# Patient Record
Sex: MALE | Race: WHITE | NOT HISPANIC OR LATINO | Employment: FULL TIME | URBAN - METROPOLITAN AREA
[De-identification: names, ages, dates, MRNs, and addresses within clinical notes are randomized per-mention and may not be internally consistent; named-entity substitution may affect disease eponyms.]

---

## 2020-12-21 ENCOUNTER — HOSPITAL ENCOUNTER (EMERGENCY)
Facility: HOSPITAL | Age: 55
Discharge: HOME | End: 2020-12-21
Payer: COMMERCIAL

## 2020-12-21 VITALS
SYSTOLIC BLOOD PRESSURE: 141 MMHG | TEMPERATURE: 98.5 F | RESPIRATION RATE: 18 BRPM | HEIGHT: 67 IN | DIASTOLIC BLOOD PRESSURE: 82 MMHG | BODY MASS INDEX: 31.39 KG/M2 | HEART RATE: 82 BPM | WEIGHT: 200 LBS | OXYGEN SATURATION: 98 %

## 2020-12-21 DIAGNOSIS — Z20.822 CLOSE EXPOSURE TO COVID-19 VIRUS: Primary | ICD-10-CM

## 2020-12-21 PROCEDURE — 99282 EMERGENCY DEPT VISIT SF MDM: CPT

## 2020-12-21 PROCEDURE — 99283 EMERGENCY DEPT VISIT LOW MDM: CPT

## 2020-12-21 PROCEDURE — U0003 INFECTIOUS AGENT DETECTION BY NUCLEIC ACID (DNA OR RNA); SEVERE ACUTE RESPIRATORY SYNDROME CORONAVIRUS 2 (SARS-COV-2) (CORONAVIRUS DISEASE [COVID-19]), AMPLIFIED PROBE TECHNIQUE, MAKING USE OF HIGH THROUGHPUT TECHNOLOGIES AS DESCRIBED BY CMS-2020-01-R: HCPCS

## 2020-12-23 LAB — SARS-COV-2 RNA SPEC QL NAA+PROBE: DETECTED

## 2024-07-18 ENCOUNTER — TELEPHONE (OUTPATIENT)
Age: 59
End: 2024-07-18

## 2024-07-18 ENCOUNTER — PREP FOR PROCEDURE (OUTPATIENT)
Age: 59
End: 2024-07-18

## 2024-07-18 ENCOUNTER — OFFICE VISIT (OUTPATIENT)
Age: 59
End: 2024-07-18

## 2024-07-18 VITALS
DIASTOLIC BLOOD PRESSURE: 90 MMHG | TEMPERATURE: 98.2 F | OXYGEN SATURATION: 96 % | SYSTOLIC BLOOD PRESSURE: 152 MMHG | RESPIRATION RATE: 18 BRPM | HEART RATE: 95 BPM | WEIGHT: 226.8 LBS | BODY MASS INDEX: 35.6 KG/M2 | HEIGHT: 67 IN

## 2024-07-18 DIAGNOSIS — Z12.11 SCREENING FOR COLON CANCER: ICD-10-CM

## 2024-07-18 DIAGNOSIS — R63.5 WEIGHT GAIN: ICD-10-CM

## 2024-07-18 DIAGNOSIS — Z11.4 SCREENING FOR HIV (HUMAN IMMUNODEFICIENCY VIRUS): ICD-10-CM

## 2024-07-18 DIAGNOSIS — Z11.59 NEED FOR HEPATITIS C SCREENING TEST: ICD-10-CM

## 2024-07-18 DIAGNOSIS — Z01.00: ICD-10-CM

## 2024-07-18 DIAGNOSIS — R06.83 SNORING: ICD-10-CM

## 2024-07-18 DIAGNOSIS — K57.92 DIVERTICULITIS: ICD-10-CM

## 2024-07-18 DIAGNOSIS — F17.210 SMOKING GREATER THAN 20 PACK YEARS: ICD-10-CM

## 2024-07-18 DIAGNOSIS — I10 PRIMARY HYPERTENSION: Primary | ICD-10-CM

## 2024-07-18 DIAGNOSIS — F17.210 TOBACCO DEPENDENCE DUE TO CIGARETTES: ICD-10-CM

## 2024-07-18 DIAGNOSIS — Z12.11 SCREENING FOR COLON CANCER: Primary | ICD-10-CM

## 2024-07-18 NOTE — ASSESSMENT & PLAN NOTE
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
## 2024-07-18 NOTE — TELEPHONE ENCOUNTER
Scheduled date of colonoscopy (as of today): 8/7/24    Physician performing colonoscopy:  EMILIANO    Location of colonoscopy:  [facility]    Bowel prep reviewed with patient:  BASIA/KATARINA    Instructions reviewed with patient by: RAUL    Clearances:  NA  OA  Screening  Email

## 2024-07-18 NOTE — TELEPHONE ENCOUNTER
07/18/24  Screened by: Emy Carrizales    Referring Provider PCP    Pre- Screening:     There is no height or weight on file to calculate BMI.  Has patient been referred for a routine screening Colonoscopy? yes  Is the patient between 45-75 years old? yes      Previous Colonoscopy no   If yes:    Date:     Facility:     Reason:       Does the patient want to see a Gastroenterologist prior to their procedure OR are they having any GI symptoms? no    Has the patient been hospitalized or had abdominal surgery in the past 6 months? no    Does the patient use supplemental oxygen? no    Does the patient take Coumadin, Lovenox, Plavix, Elliquis, Xarelto, or other blood thinning medication? no    Has the patient had a stroke, cardiac event, or stent placed in the past year? no

## 2024-07-23 LAB
ALBUMIN SERPL-MCNC: 4.1 G/DL (ref 3.6–5.1)
ALBUMIN/GLOB SERPL: 1.8 (CALC) (ref 1–2.5)
ALP SERPL-CCNC: 82 U/L (ref 35–144)
ALT SERPL-CCNC: 28 U/L (ref 9–46)
AST SERPL-CCNC: 25 U/L (ref 10–35)
BASOPHILS # BLD AUTO: 63 CELLS/UL (ref 0–200)
BASOPHILS NFR BLD AUTO: 0.7 %
BILIRUB SERPL-MCNC: 0.5 MG/DL (ref 0.2–1.2)
BUN SERPL-MCNC: 15 MG/DL (ref 7–25)
BUN/CREAT SERPL: ABNORMAL (CALC) (ref 6–22)
CALCIUM SERPL-MCNC: 8.7 MG/DL (ref 8.6–10.3)
CHLORIDE SERPL-SCNC: 109 MMOL/L (ref 98–110)
CHOLEST SERPL-MCNC: 192 MG/DL
CHOLEST/HDLC SERPL: 4.4 (CALC)
CO2 SERPL-SCNC: 22 MMOL/L (ref 20–32)
CREAT SERPL-MCNC: 0.94 MG/DL (ref 0.7–1.3)
EOSINOPHIL # BLD AUTO: 171 CELLS/UL (ref 15–500)
EOSINOPHIL NFR BLD AUTO: 1.9 %
ERYTHROCYTE [DISTWIDTH] IN BLOOD BY AUTOMATED COUNT: 12.7 % (ref 11–15)
GFR/BSA.PRED SERPLBLD CYS-BASED-ARV: 95 ML/MIN/1.73M2
GLOBULIN SER CALC-MCNC: 2.3 G/DL (CALC) (ref 1.9–3.7)
GLUCOSE SERPL-MCNC: 181 MG/DL (ref 65–99)
HCT VFR BLD AUTO: 43 % (ref 38.5–50)
HDLC SERPL-MCNC: 44 MG/DL
HGB BLD-MCNC: 14.1 G/DL (ref 13.2–17.1)
LDLC SERPL CALC-MCNC: 122 MG/DL (CALC)
LYMPHOCYTES # BLD AUTO: 2970 CELLS/UL (ref 850–3900)
LYMPHOCYTES NFR BLD AUTO: 33 %
MCH RBC QN AUTO: 27.4 PG (ref 27–33)
MCHC RBC AUTO-ENTMCNC: 32.8 G/DL (ref 32–36)
MCV RBC AUTO: 83.5 FL (ref 80–100)
MONOCYTES # BLD AUTO: 747 CELLS/UL (ref 200–950)
MONOCYTES NFR BLD AUTO: 8.3 %
NEUTROPHILS # BLD AUTO: 5049 CELLS/UL (ref 1500–7800)
NEUTROPHILS NFR BLD AUTO: 56.1 %
NONHDLC SERPL-MCNC: 148 MG/DL (CALC)
PLATELET # BLD AUTO: 234 THOUSAND/UL (ref 140–400)
PMV BLD REES-ECKER: 11 FL (ref 7.5–12.5)
POTASSIUM SERPL-SCNC: 4.2 MMOL/L (ref 3.5–5.3)
PROT SERPL-MCNC: 6.4 G/DL (ref 6.1–8.1)
RBC # BLD AUTO: 5.15 MILLION/UL (ref 4.2–5.8)
SODIUM SERPL-SCNC: 142 MMOL/L (ref 135–146)
TRIGL SERPL-MCNC: 143 MG/DL
TSH SERPL-ACNC: 1.75 MIU/L (ref 0.4–4.5)
WBC # BLD AUTO: 9 THOUSAND/UL (ref 3.8–10.8)

## 2024-07-24 ENCOUNTER — HOSPITAL ENCOUNTER (OUTPATIENT)
Dept: CT IMAGING | Facility: HOSPITAL | Age: 59
Discharge: HOME | End: 2024-07-24
Payer: COMMERCIAL

## 2024-07-24 DIAGNOSIS — F17.210 TOBACCO DEPENDENCE DUE TO CIGARETTES: ICD-10-CM

## 2024-07-24 DIAGNOSIS — F17.210 SMOKING GREATER THAN 20 PACK YEARS: ICD-10-CM

## 2024-07-24 PROCEDURE — 71271 CT THORAX LUNG CANCER SCR C-: CPT

## 2024-07-25 ENCOUNTER — TELEPHONE (OUTPATIENT)
Age: 59
End: 2024-07-25

## 2024-07-26 ENCOUNTER — OFFICE VISIT (OUTPATIENT)
Age: 59
End: 2024-07-26
Payer: COMMERCIAL

## 2024-07-26 VITALS
SYSTOLIC BLOOD PRESSURE: 126 MMHG | HEIGHT: 67 IN | OXYGEN SATURATION: 98 % | DIASTOLIC BLOOD PRESSURE: 74 MMHG | WEIGHT: 228.8 LBS | HEART RATE: 90 BPM | TEMPERATURE: 97.8 F | BODY MASS INDEX: 35.91 KG/M2

## 2024-07-26 DIAGNOSIS — R73.03 PREDIABETES: ICD-10-CM

## 2024-07-26 DIAGNOSIS — Z71.6 ENCOUNTER FOR TOBACCO USE CESSATION COUNSELING: ICD-10-CM

## 2024-07-26 DIAGNOSIS — I10 PRIMARY HYPERTENSION: ICD-10-CM

## 2024-07-26 DIAGNOSIS — F17.210 TOBACCO DEPENDENCE DUE TO CIGARETTES: ICD-10-CM

## 2024-07-26 DIAGNOSIS — Z00.00 ANNUAL PHYSICAL EXAM: Primary | ICD-10-CM

## 2024-07-26 LAB — SL AMB POCT HEMOGLOBIN AIC: 6.1 (ref ?–6.5)

## 2024-07-26 PROCEDURE — 83036 HEMOGLOBIN GLYCOSYLATED A1C: CPT

## 2024-07-26 PROCEDURE — 99406 BEHAV CHNG SMOKING 3-10 MIN: CPT

## 2024-07-26 PROCEDURE — 99396 PREV VISIT EST AGE 40-64: CPT

## 2024-07-26 PROCEDURE — 99213 OFFICE O/P EST LOW 20 MIN: CPT

## 2024-07-26 RX ORDER — ATORVASTATIN CALCIUM 10 MG/1
10 TABLET, FILM COATED ORAL DAILY
Qty: 90 TABLET | Refills: 0 | Status: SHIPPED | OUTPATIENT
Start: 2024-07-26 | End: 2024-10-24

## 2024-07-26 RX ORDER — VARENICLINE TARTRATE 0.5 (11)-1
KIT ORAL
Qty: 53 EACH | Refills: 0 | Status: SHIPPED | OUTPATIENT
Start: 2024-07-26

## 2024-07-26 NOTE — ASSESSMENT & PLAN NOTE
BP Readings from Last 1 Encounters:   07/26/24 126/74     Blood pressure reading within normal limits at visit today  As per patient he states he feels more calm, likely some element of whitecoat hypertension present  We will continue to monitor, will not initiate antihypertensives at this visit due to normal blood pressure reading  Return to clinic in 1 month, if elevated at that time can consider initiation of low-dose ARB

## 2024-07-26 NOTE — ASSESSMENT & PLAN NOTE
Lab Results   Component Value Date    HGBA1C 6.1 07/26/2024     Point-of-care A1c demonstrated value of 6.1, consistent with prediabetes  Patient advised of dietary and lifestyle modification  Will withhold initiation of diabetes controlling medications at this point in time, will trial lifestyle modification  Return to clinic in 1 month for reassessment

## 2024-07-26 NOTE — PATIENT INSTRUCTIONS
"Patient Education     Routine physical for adults   The Basics   Written by the doctors and editors at [facility]   What is a physical? -- A physical is a routine visit, or \"check-up,\" with your doctor. You might also hear it called a \"wellness visit\" or \"preventive visit.\"  During each visit, the doctor will:   Ask about your physical and mental health   Ask about your habits, behaviors, and lifestyle   Do an exam   Give you vaccines if needed   Talk to you about any medicines you take   Give advice about your health   Answer your questions  Getting regular check-ups is an important part of taking care of your health. It can help your doctor find and treat any problems you have. But it's also important for preventing health problems.  A routine physical is different from a \"sick visit.\" A sick visit is when you see a doctor because of a health concern or problem. Since physicals are scheduled ahead of time, you can think about what you want to ask the doctor.  How often should I get a physical? -- It depends on your age and health. In general, for people age 21 years and older:   If you are younger than 50 years, you might be able to get a physical every 3 years.   If you are 50 years or older, your doctor might recommend a physical every year.  If you have an ongoing health condition, like diabetes or high blood pressure, your doctor will probably want to see you more often.  What happens during a physical? -- In general, each visit will include:   Physical exam - The doctor or nurse will check your height, weight, heart rate, and blood pressure. They will also look at your eyes and ears. They will ask about how you are feeling and whether you have any symptoms that bother you.   Medicines - It's a good idea to bring a list of all the medicines you take to each doctor visit. Your doctor will talk to you about your medicines and answer any questions. Tell them if you are having any side effects that bother you. You " "should also tell them if you are having trouble paying for any of your medicines.   Habits and behaviors - This includes:   Your diet   Your exercise habits   Whether you smoke, drink alcohol, or use drugs   Whether you are sexually active   Whether you feel safe at home  Your doctor will talk to you about things you can do to improve your health and lower your risk of health problems. They will also offer help and support. For example, if you want to quit smoking, they can give you advice and might prescribe medicines. If you want to improve your diet or get more physical activity, they can help you with this, too.   Lab tests, if needed - The tests you get will depend on your age and situation. For example, your doctor might want to check your:   Cholesterol   Blood sugar   Iron level   Vaccines - The recommended vaccines will depend on your age, health, and what vaccines you already had. Vaccines are very important because they can prevent certain serious or deadly infections.   Discussion of screening - \"Screening\" means checking for diseases or other health problems before they cause symptoms. Your doctor can recommend screening based on your age, risk, and preferences. This might include tests to check for:   Cancer, such as breast, prostate, cervical, ovarian, colorectal, prostate, lung, or skin cancer   Sexually transmitted infections, such as chlamydia and gonorrhea   Mental health conditions like depression and anxiety  Your doctor will talk to you about the different types of screening tests. They can help you decide which screenings to have. They can also explain what the results might mean.   Answering questions - The physical is a good time to ask the doctor or nurse questions about your health. If needed, they can refer you to other doctors or specialists, too.  Adults older than 65 years often need other care, too. As you get older, your doctor will talk to you about:   How to prevent falling at " home   Hearing or vision tests   Memory testing   How to take your medicines safely   Making sure that you have the help and support you need at home  All topics are updated as new evidence becomes available and our peer review process is complete.  This topic retrieved from [employer] on: May 02, 2024.  Topic 035456 Version 1.0  Release: 32.4.3 - C32.122  © 2024 UpToDate, Inc. and/or its affiliates. All rights reserved.  Consumer Information Use and Disclaimer   Disclaimer: This generalized information is a limited summary of diagnosis, treatment, and/or medication information. It is not meant to be comprehensive and should be used as a tool to help the user understand and/or assess potential diagnostic and treatment options. It does NOT include all information about conditions, treatments, medications, side effects, or risks that may apply to a specific patient. It is not intended to be medical advice or a substitute for the medical advice, diagnosis, or treatment of a health care provider based on the health care provider's examination and assessment of a patient's specific and unique circumstances. Patients must speak with a health care provider for complete information about their health, medical questions, and treatment options, including any risks or benefits regarding use of medications. This information does not endorse any treatments or medications as safe, effective, or approved for treating a specific patient. UpToDate, Inc. and its affiliates disclaim any warranty or liability relating to this information or the use thereof.The use of this information is governed by the Terms of Use, available at https://www.[employer].uwer.com/en/know/clinical-effectiveness-terms. 2024© UpToDate, Inc. and its affiliates and/or licensors. All rights reserved.  Copyright   © 2024 UpToDate, Inc. and/or its affiliates. All rights reserved.

## 2024-07-26 NOTE — PROGRESS NOTES
Adult Annual Physical  Name: Juan M Silver      : 1965      MRN: [id number]  Encounter Provider: Walter Chong MD  Encounter Date: 2024   Encounter department: [facility]    Assessment & Plan   Patient is a 59-year-old male with past medical history of hypertension, tobacco dependence, diverticulitis, who presents in for follow-up on labs and for annual physical.  Patient recently presented to [facility] and had blood work ordered at this time, with CMP demonstrating blood sugar of 180.  Patient does believe he was fasting for this blood work, but cannot recall with certainty.  A1c ordered at this time never performed by outside laboratory.  Repeat A1c ordered at visit today to confirm or disprove fasting status with findings of prediabetes.  Patient counseled on this finding, as well as on annual physical.  With completed labs and smoking history patient demonstrated elevated ASCVD risk of 12% and is thus a candidate for moderate intensity statin therapy.  Patient be initiated on Lipitor 10 mg daily with repeat CMP and lipid panel in 1 month to assess for medication tolerance with up titration from there.  Patient additionally started on Chantix with tobacco cessation counseling provided today.  Return in 1 month for med tolerance and escalation.    1. Annual physical exam  2. Prediabetes  Assessment & Plan:  Lab Results   Component Value Date    HGBA1C 6.1 2024     Point-of-care A1c demonstrated value of 6.1, consistent with prediabetes  Patient advised of dietary and lifestyle modification  Will withhold initiation of diabetes controlling medications at this point in time, will trial lifestyle modification  Return to clinic in 1 month for reassessment  Orders:  -     POCT hemoglobin A1c  -     atorvastatin (LIPITOR) 10 mg tablet; Take 1 tablet (10 mg total) by mouth daily  -     Comprehensive metabolic panel; Future  -     Lipid panel; Future  -     Comprehensive  metabolic panel  -     Lipid panel  3. Primary hypertension  Assessment & Plan:  BP Readings from Last 1 Encounters:   07/26/24 126/74     Blood pressure reading within normal limits at visit today  As per patient he states he feels more calm, likely some element of whitecoat hypertension present  We will continue to monitor, will not initiate antihypertensives at this visit due to normal blood pressure reading  Return to clinic in 1 month, if elevated at that time can consider initiation of low-dose ARB  Orders:  -     atorvastatin (LIPITOR) 10 mg tablet; Take 1 tablet (10 mg total) by mouth daily  -     Comprehensive metabolic panel; Future  -     Lipid panel; Future  -     Comprehensive metabolic panel  -     Lipid panel  4. Tobacco dependence due to cigarettes  Assessment & Plan:  Patient has 40-pack-year smoking history, currently smoking actively at this time  CT lung cancer screening ordered at prior visit, study has been performed awaiting interpretation  Patient endorses interest in tobacco cessation, various pharmacotherapy outlined with patient who opted for trial of Chantix    Plan:  Chantix prescribed to patient with appropriate up titration  Patient advised of side effects of medication  Referred to tobacco cessation support group  Return to clinic in 1 month for Chantix tolerance and escalation to full dose  Plan for 6-month course of medication  Orders:  -     atorvastatin (LIPITOR) 10 mg tablet; Take 1 tablet (10 mg total) by mouth daily  -     Comprehensive metabolic panel; Future  -     Lipid panel; Future  -     Varenicline Tartrate, Starter, (Chantix Starting Month Pak) 0.5 MG X 11 & 1 MG X 42 TBPK; 0.5 mg once daily for 3 days then 0.5mg twice daily for days 4-7 then 1 mg twice daily  -     Ambulatory referral to Smoking Cessation Program; Future  -     Comprehensive metabolic panel  -     Lipid panel  5. Encounter for tobacco use cessation counseling  -     Varenicline Tartrate, Starter,  (Chantix Starting Month Yovani) 0.5 MG X 11 & 1 MG X 42 TBPK; 0.5 mg once daily for 3 days then 0.5mg twice daily for days 4-7 then 1 mg twice daily  -     Ambulatory referral to Smoking Cessation Program; Future    Immunizations and preventive care screenings were discussed with patient today. Appropriate education was printed on patient's after visit summary.      Counseling:  Alcohol/drug use: discussed moderation in alcohol intake, the recommendations for healthy alcohol use, and avoidance of illicit drug use.  Dental Health: discussed importance of regular tooth brushing, flossing, and dental visits.  Injury prevention: discussed safety/seat belts, safety helmets, smoke detectors, carbon dioxide detectors, and smoking near bedding or upholstery.  Sexual health: discussed sexually transmitted diseases, partner selection, use of condoms, avoidance of unintended pregnancy, and contraceptive alternatives.  Exercise: the importance of regular exercise/physical activity was discussed. Recommend exercise 3-5 times per week for at least 30 minutes.       Depression Screening and Follow-up Plan: Patient was screened for depression during today's encounter. They screened negative with a PHQ-2 score of 0.        History of Present Illness     Adult Annual Physical:  Patient presents for annual physical.     Diet and Physical Activity:  - Diet/Nutrition: frequent junk food and poor diet.  - Exercise: no formal exercise.    Depression Screening:  - PHQ-2 Score: 0    General Health:  - Sleep: 4-6 hours of sleep on average and snores loudly.  - Hearing: normal hearing right ear and normal hearing left ear.  - Vision: wears glasses and goes for regular eye exams.  - Dental: no dental visits for > 1 year. has dentures     Health:  - History of STDs: no.   - Urinary symptoms: none.     Advanced Care Planning:  - Has an advanced directive?: no    - Has a durable medical POA?: no    - ACP document given to patient?: no      Review  "of Systems   Constitutional:  Negative for chills and fever.   HENT:  Negative for ear pain and sore throat.    Eyes:  Negative for pain and visual disturbance.   Respiratory:  Negative for cough and shortness of breath.    Cardiovascular:  Negative for chest pain and palpitations.   Gastrointestinal:  Negative for abdominal pain and vomiting.   Genitourinary:  Negative for dysuria and hematuria.   Musculoskeletal:  Negative for arthralgias and back pain.   Skin:  Negative for color change and rash.   Neurological:  Negative for seizures and syncope.   All other systems reviewed and are negative.    Medical History Reviewed by provider this encounter:  Tobacco  Allergies  Meds  Problems  Med Hx  Surg Hx  Fam Hx       Past Medical History   Past Medical History:   Diagnosis Date    Patient denies medical problems      Past Surgical History:   Procedure Laterality Date    NO PAST SURGERIES       History reviewed. No pertinent family history.  No current outpatient medications on file prior to visit.     No current facility-administered medications on file prior to visit.   No Known Allergies   No current outpatient medications on file prior to visit.     No current facility-administered medications on file prior to visit.      Social History     Tobacco Use    Smoking status: Every Day     Current packs/day: 1.00     Average packs/day: 1 pack/day for 40.0 years (40.0 ttl pk-yrs)     Types: Cigarettes     Start date: 7/18/1984    Smokeless tobacco: Never   Vaping Use    Vaping status: Former   Substance and Sexual Activity    Alcohol use: Not Currently    Drug use: Never    Sexual activity: Not on file       Objective     /74   Pulse 90   Temp 97.8 °F (36.6 °C)   Ht 5' 7\" (1.702 m)   Wt 104 kg (228 lb 12.8 oz)   SpO2 98%   BMI 35.84 kg/m²     Physical Exam  Vitals and nursing note reviewed.   Constitutional:       General: He is not in acute distress.     Appearance: He is well-developed. He is " obese. He is not ill-appearing.      Comments: BMI 35   HENT:      Head: Normocephalic and atraumatic.      Right Ear: External ear normal.      Left Ear: External ear normal.      Nose: Nose normal.      Mouth/Throat:      Mouth: Mucous membranes are moist.      Pharynx: Oropharynx is clear.   Eyes:      General: No scleral icterus.     Extraocular Movements: Extraocular movements intact.      Conjunctiva/sclera: Conjunctivae normal.   Cardiovascular:      Rate and Rhythm: Normal rate and regular rhythm.      Pulses: Normal pulses.      Heart sounds: Normal heart sounds. No murmur heard.     No friction rub. No gallop.   Pulmonary:      Effort: Pulmonary effort is normal. No respiratory distress.      Breath sounds: Normal breath sounds. No wheezing, rhonchi or rales.   Abdominal:      Palpations: Abdomen is soft. There is no mass.      Tenderness: There is no abdominal tenderness. There is no guarding.   Musculoskeletal:         General: No swelling.      Cervical back: Neck supple.      Right lower leg: No edema.      Left lower leg: No edema.   Lymphadenopathy:      Cervical: No cervical adenopathy.   Skin:     General: Skin is warm and dry.      Capillary Refill: Capillary refill takes less than 2 seconds.   Neurological:      Mental Status: He is alert. Mental status is at baseline.   Psychiatric:         Mood and Affect: Mood normal.

## 2024-07-26 NOTE — LETTER
To whom it may concern,    Juan M Silver is under my medical care. He has been seen and evaluated in my office on several occasions. His blood pressure has remained within normal limits on these visits, and he will continue to have routine follow up with my office for ongoing management. At this point I believe his is fit to return to work as a .     If you have any questions, please don't hesitate to reach out to our office  Sincerely,    Dr. Walter Chong MD

## 2024-07-29 ENCOUNTER — PATIENT OUTREACH (OUTPATIENT)
Dept: OTHER | Facility: CLINIC | Age: 59
End: 2024-07-29

## 2024-07-31 ENCOUNTER — TELEPHONE (OUTPATIENT)
Age: 59
End: 2024-07-31

## 2024-07-31 NOTE — TELEPHONE ENCOUNTER
Rescheduled    Scheduled date of colonoscopy (as of today): 8-  Physician performing colonoscopy: Dr. Mcnamara  Location of colonoscopy: [facility]  Bowel prep reviewed with patient: miralax dulcolax reviewed 7-  Instructions reviewed with patient by: MARK  Clearances: N/A

## 2024-08-04 DIAGNOSIS — N28.1 CYST OF LEFT KIDNEY: Primary | ICD-10-CM

## 2024-08-05 ENCOUNTER — TELEPHONE (OUTPATIENT)
Dept: FAMILY MEDICINE CLINIC | Facility: CLINIC | Age: 59
End: 2024-08-05

## 2024-08-05 NOTE — TELEPHONE ENCOUNTER
Lvm to go over results    Walter Chong MD  [facility]  Lung cancer screening is within normal limits, however the scan did cover a portion of the left kidney with findings of a small fluid-filled pocket called a cyst. I have ordered a renal ultrasound of this area to better evaluate this finding. Please call patient and advise. Thank you!

## 2024-08-05 NOTE — PROGRESS NOTES
Recent CT lungs cancer screening for this patient demonstrated no acute pulmonary pathology or suspicious lesions, but did demonstrate left kidney cystic structure.  Per radiologist read, recommend renal ultrasound for better evaluation of this finding.  Ultrasound ordered, patient advised via [employer] in office advised to call in a.m. for follow-up.

## 2024-08-06 ENCOUNTER — RA CDI HCC (OUTPATIENT)
Dept: OTHER | Facility: HOSPITAL | Age: 59
End: 2024-08-06

## 2024-08-06 DIAGNOSIS — E66.01 SEVERE OBESITY (BMI 35.0-39.9) WITH COMORBIDITY (HCC): Primary | ICD-10-CM

## 2024-08-06 NOTE — PROGRESS NOTES
HCC coding opportunities          Chart Reviewed number of suggestions sent to Provider: 1   E66.01, Z68.35    Patients Insurance        Commercial Insurance: [employer]

## 2024-08-09 ENCOUNTER — HOSPITAL ENCOUNTER (OUTPATIENT)
Dept: ULTRASOUND IMAGING | Facility: HOSPITAL | Age: 59
End: 2024-08-09
Payer: COMMERCIAL

## 2024-08-09 DIAGNOSIS — N28.1 CYST OF LEFT KIDNEY: ICD-10-CM

## 2024-08-09 PROCEDURE — 76770 US EXAM ABDO BACK WALL COMP: CPT

## 2024-08-12 ENCOUNTER — OFFICE VISIT (OUTPATIENT)
Age: 59
End: 2024-08-12
Payer: COMMERCIAL

## 2024-08-12 VITALS
HEIGHT: 67 IN | HEART RATE: 77 BPM | WEIGHT: 231.6 LBS | BODY MASS INDEX: 36.35 KG/M2 | DIASTOLIC BLOOD PRESSURE: 82 MMHG | RESPIRATION RATE: 16 BRPM | SYSTOLIC BLOOD PRESSURE: 142 MMHG | OXYGEN SATURATION: 97 %

## 2024-08-12 DIAGNOSIS — I10 PRIMARY HYPERTENSION: ICD-10-CM

## 2024-08-12 DIAGNOSIS — N28.1 RENAL CYST, LEFT: ICD-10-CM

## 2024-08-12 DIAGNOSIS — F17.210 TOBACCO DEPENDENCE DUE TO CIGARETTES: Primary | ICD-10-CM

## 2024-08-12 PROCEDURE — 99213 OFFICE O/P EST LOW 20 MIN: CPT

## 2024-08-12 RX ORDER — VARENICLINE TARTRATE 1 MG/1
1 TABLET, FILM COATED ORAL 2 TIMES DAILY
Qty: 60 TABLET | Refills: 2 | Status: SHIPPED | OUTPATIENT
Start: 2024-08-12 | End: 2024-11-10

## 2024-08-12 NOTE — ASSESSMENT & PLAN NOTE
BP Readings from Last 1 Encounters:   08/12/24 142/82   Blood pressure initially elevated at office visit today, improved on recheck  Likely symptom of whitecoat hypertension, no indication for initiation of antihypertensives at this visit  Continue to monitor, low threshold to initiate antihypertensives in this patient

## 2024-08-12 NOTE — PROGRESS NOTES
Ambulatory Visit  Name: Juan M Silver      : 1965      MRN: [id number]  Encounter Provider: Walter Chong MD  Encounter Date: 2024   Encounter department: [facility]    Assessment & Plan   Patient is a 59-year-old male past medical history of obesity, diverticulitis, prediabetes, presents today for review of imaging.  Recently ordered renal ultrasound demonstrates simple renal cyst, no further concerns at this point in time.  Patient tolerating Chantix therapy well at this point in time, has nearly completed starter pack and will be escalated to full dose for 5 months to complete 6 months overall.  Return to clinic in 6 months for recheck of liver function and Chantix discontinuation, or sooner if needed for problem visit    1. Tobacco dependence due to cigarettes  Assessment & Plan:  Not currently smoking on 1mg Chantix BID  Increased appetite since cessation of smoking  PLAN:   -continue 1mg Chantix BID   -counseled on exercise and watching diet as cessation of tobacco may increase appetite     Orders:  -     varenicline (CHANTIX) 1 mg tablet; Take 1 tablet (1 mg total) by mouth 2 (two) times a day  2. Renal cyst, left  Assessment & Plan:  Kidney and bladder US on 24 showed:   1. Benign-appearing simple cyst in the left lower pole measuring 1.7 cm.   2. Nonobstructing calculus in the left lower pole measuring 0.6 cm.  PLAN:   -no further evaluation necessary   -will investigate further if hematuria or flank pain occur   3. Primary hypertension  Assessment & Plan:  BP Readings from Last 1 Encounters:   24 142/82   Blood pressure initially elevated at office visit today, improved on recheck  Likely symptom of whitecoat hypertension, no indication for initiation of antihypertensives at this visit  Continue to monitor, low threshold to initiate antihypertensives in this patient       History of Present Illness     Mr. Silver is a 59M with a PMHx of hypertension, tobacco  dependence, diverticulitis, HLD, prediabetes who presents for results follow up of a kidney/bladder US after an incidental finding of a L kidney cystic lesion on lung cancer screening CT. Mr. Silver denies any flank pain or hematuria, denies personal history of kidney stones.   He is slightly concerned about his weight gain over the past few weeks, but notes he has been eating more since he quit smoking ~1 month ago.           Review of Systems   Constitutional:  Positive for appetite change. Negative for activity change.   Gastrointestinal:  Negative for abdominal distention, abdominal pain, constipation, diarrhea and nausea.   Genitourinary:  Negative for difficulty urinating, dysuria, flank pain and hematuria.     Medical History Reviewed by provider this encounter:  Tobacco  Allergies  Meds  Problems  Med Hx  Surg Hx  Fam Hx       Past Medical History   Past Medical History:   Diagnosis Date    Patient denies medical problems      Past Surgical History:   Procedure Laterality Date    NO PAST SURGERIES       History reviewed. No pertinent family history.  Current Outpatient Medications on File Prior to Visit   Medication Sig Dispense Refill    atorvastatin (LIPITOR) 10 mg tablet Take 1 tablet (10 mg total) by mouth daily 90 tablet 0    Varenicline Tartrate, Starter, (Chantix Starting Month Yovani) 0.5 MG X 11 & 1 MG X 42 TBPK 0.5 mg once daily for 3 days then 0.5mg twice daily for days 4-7 then 1 mg twice daily 53 each 0     No current facility-administered medications on file prior to visit.   No Known Allergies   Current Outpatient Medications on File Prior to Visit   Medication Sig Dispense Refill    atorvastatin (LIPITOR) 10 mg tablet Take 1 tablet (10 mg total) by mouth daily 90 tablet 0    Varenicline Tartrate, Starter, (Chantix Starting Month Yovani) 0.5 MG X 11 & 1 MG X 42 TBPK 0.5 mg once daily for 3 days then 0.5mg twice daily for days 4-7 then 1 mg twice daily 53 each 0     No current  "facility-administered medications on file prior to visit.      Social History     Tobacco Use    Smoking status: Every Day     Current packs/day: 1.00     Average packs/day: 1 pack/day for 40.1 years (40.1 ttl pk-yrs)     Types: Cigarettes     Start date: 7/18/1984    Smokeless tobacco: Never   Vaping Use    Vaping status: Former   Substance and Sexual Activity    Alcohol use: Not Currently    Drug use: Never    Sexual activity: Not on file     Objective     /82   Pulse 77   Resp 16   Ht 5' 7\" (1.702 m)   Wt 105 kg (231 lb 9.6 oz)   SpO2 97%   BMI 36.27 kg/m²     Physical Exam  Constitutional:       General: He is not in acute distress.     Appearance: He is not ill-appearing or toxic-appearing.   HENT:      Head: Normocephalic.      Mouth/Throat:      Mouth: Mucous membranes are moist.   Eyes:      Extraocular Movements: Extraocular movements intact.      Conjunctiva/sclera: Conjunctivae normal.      Pupils: Pupils are equal, round, and reactive to light.   Cardiovascular:      Rate and Rhythm: Normal rate and regular rhythm.      Heart sounds: Normal heart sounds. No murmur heard.     No friction rub. No gallop.   Pulmonary:      Effort: Pulmonary effort is normal.      Breath sounds: Normal breath sounds.   Abdominal:      Palpations: Abdomen is soft.      Tenderness: There is no abdominal tenderness.   Musculoskeletal:      Right lower leg: No edema.      Left lower leg: No edema.   Skin:     General: Skin is warm and dry.   Neurological:      Mental Status: He is alert.   Psychiatric:         Mood and Affect: Mood normal.         Behavior: Behavior normal.       Administrative Statements           "

## 2024-08-12 NOTE — ASSESSMENT & PLAN NOTE
Not currently smoking on 1mg Chantix BID  Increased appetite since cessation of smoking  PLAN:   -continue 1mg Chantix BID   -counseled on exercise and watching diet as cessation of tobacco may increase appetite

## 2024-08-12 NOTE — ASSESSMENT & PLAN NOTE
Kidney and bladder US on 8/9/24 showed:   1. Benign-appearing simple cyst in the left lower pole measuring 1.7 cm.   2. Nonobstructing calculus in the left lower pole measuring 0.6 cm.  PLAN:   -no further evaluation necessary   -will investigate further if hematuria or flank pain occur

## 2024-08-20 ENCOUNTER — ANESTHESIA (OUTPATIENT)
Dept: GASTROENTEROLOGY | Facility: HOSPITAL | Age: 59
End: 2024-08-20

## 2024-08-20 ENCOUNTER — HOSPITAL ENCOUNTER (OUTPATIENT)
Dept: GASTROENTEROLOGY | Facility: HOSPITAL | Age: 59
Setting detail: OUTPATIENT SURGERY
Discharge: HOME | End: 2024-08-20
Attending: COLON & RECTAL SURGERY
Payer: COMMERCIAL

## 2024-08-20 ENCOUNTER — ANESTHESIA EVENT (OUTPATIENT)
Dept: GASTROENTEROLOGY | Facility: HOSPITAL | Age: 59
End: 2024-08-20

## 2024-08-20 VITALS
RESPIRATION RATE: 17 BRPM | DIASTOLIC BLOOD PRESSURE: 92 MMHG | OXYGEN SATURATION: 95 % | BODY MASS INDEX: 36 KG/M2 | HEART RATE: 83 BPM | HEIGHT: 67 IN | WEIGHT: 229.4 LBS | SYSTOLIC BLOOD PRESSURE: 150 MMHG | TEMPERATURE: 97.6 F

## 2024-08-20 DIAGNOSIS — Z12.11 SCREENING FOR COLON CANCER: ICD-10-CM

## 2024-08-20 PROCEDURE — 88305 TISSUE EXAM BY PATHOLOGIST: CPT | Performed by: PATHOLOGY

## 2024-08-20 PROCEDURE — 45385 COLONOSCOPY W/LESION REMOVAL: CPT | Performed by: SURGERY

## 2024-08-20 RX ORDER — SODIUM CHLORIDE, SODIUM LACTATE, POTASSIUM CHLORIDE, CALCIUM CHLORIDE 600; 310; 30; 20 MG/100ML; MG/100ML; MG/100ML; MG/100ML
INJECTION, SOLUTION INTRAVENOUS CONTINUOUS PRN
Status: DISCONTINUED | OUTPATIENT
Start: 2024-08-20 | End: 2024-08-20

## 2024-08-20 RX ORDER — PROPOFOL 10 MG/ML
INJECTION, EMULSION INTRAVENOUS AS NEEDED
Status: DISCONTINUED | OUTPATIENT
Start: 2024-08-20 | End: 2024-08-20

## 2024-08-20 RX ADMIN — PROPOFOL 150 MG: 10 INJECTION, EMULSION INTRAVENOUS at 10:01

## 2024-08-20 RX ADMIN — PROPOFOL 50 MG: 10 INJECTION, EMULSION INTRAVENOUS at 10:04

## 2024-08-20 RX ADMIN — PROPOFOL 50 MG: 10 INJECTION, EMULSION INTRAVENOUS at 10:10

## 2024-08-20 RX ADMIN — SODIUM CHLORIDE, SODIUM LACTATE, POTASSIUM CHLORIDE, AND CALCIUM CHLORIDE: .6; .31; .03; .02 INJECTION, SOLUTION INTRAVENOUS at 09:44

## 2024-08-20 RX ADMIN — PROPOFOL 50 MG: 10 INJECTION, EMULSION INTRAVENOUS at 10:07

## 2024-08-20 RX ADMIN — PROPOFOL 50 MG: 10 INJECTION, EMULSION INTRAVENOUS at 10:15

## 2024-08-20 NOTE — ANESTHESIA PREPROCEDURE EVALUATION
Procedure:  COLONOSCOPY    Relevant Problems   ANESTHESIA (within normal limits)      CARDIO   (+) Primary hypertension      /RENAL   (+) Renal cyst, left      Other   (+) Prediabetes        Physical Exam    Airway    Mallampati score: III  TM Distance: >3 FB  Neck ROM: full     Dental   Comment: Full upper plate; partial lower plate     Cardiovascular      Pulmonary      Other Findings        Anesthesia Plan  ASA Score- 2     Anesthesia Type- IV sedation with anesthesia with ASA Monitors.         Additional Monitors:     Airway Plan:            Plan Factors-Exercise tolerance (METS): >4 METS.    Chart reviewed.   Existing labs reviewed. Patient summary reviewed.    Patient is not a current smoker.              Induction- intravenous.    Postoperative Plan-         Informed Consent- Anesthetic plan and risks discussed with patient.  I personally reviewed this patient with the CRNA. Discussed and agreed on the Anesthesia Plan with the CRNA..

## 2024-08-20 NOTE — H&P
History and Physical   Colon and Rectal Surgery   Juan M Silver 59 y.o. male MRN: [id number]  Unit/Bed#:  Encounter: [id number]  08/20/24   @[facility]    No chief complaint on file.        History of Present Illness   HPI:  Juan M Silver is a 59 y.o. male who presents for colonoscopy.      Historical Information   Past Medical History:   Diagnosis Date    Patient denies medical problems      Past Surgical History:   Procedure Laterality Date    NO PAST SURGERIES         Meds/Allergies     Not in a hospital admission.      Current Outpatient Medications:     atorvastatin (LIPITOR) 10 mg tablet, Take 1 tablet (10 mg total) by mouth daily, Disp: 90 tablet, Rfl: 0    varenicline (CHANTIX) 1 mg tablet, Take 1 tablet (1 mg total) by mouth 2 (two) times a day, Disp: 60 tablet, Rfl: 2    Varenicline Tartrate, Starter, (Chantix Starting Month Pak) 0.5 MG X 11 & 1 MG X 42 TBPK, 0.5 mg once daily for 3 days then 0.5mg twice daily for days 4-7 then 1 mg twice daily, Disp: 53 each, Rfl: 0    No Known Allergies      Social History   Social History     Substance and Sexual Activity   Alcohol Use Not Currently     Social History     Substance and Sexual Activity   Drug Use Never     Social History     Tobacco Use   Smoking Status Every Day    Current packs/day: 1.00    Average packs/day: 1 pack/day for 40.1 years (40.1 ttl pk-yrs)    Types: Cigarettes    Start date: 7/18/1984   Smokeless Tobacco Never         Family History: No family history on file.      Objective     Current Vitals:      No intake or output data in the 24 hours ending 08/20/24 0841    Physical Exam:  General:  Resting comfortably in bed   Eyes:Sclera anicteric  ENT: Trachea midline  Pulm:  Symmetric chest raise.  No respiratory Distress  CV:  Regular on monitor  Abdomen:  Soft NT ND  Extremities:  No clubbing/ cyanosis/ edema    Lab Results: I have personally reviewed pertinent lab results.    Imaging: I have personally reviewed pertinent reports.        ASSESSMENT:   Juan M Silver is a 59 y.o. male who presents for outpatient colonoscopy.      PLAN:  For colonoscopy    Risks/ Benefits reviewed to include but not limited to anesthesia, bleeding, missed lesions, and colonoscopic perforation requiring surgery.

## 2024-08-20 NOTE — ANESTHESIA POSTPROCEDURE EVALUATION
Post-Op Assessment Note    CV Status:  Stable  Pain Score: 0    Pain management: adequate       Mental Status:  Arousable and sleepy   Hydration Status:  Stable   PONV Controlled:  Controlled   Airway Patency:  Patent     Post Op Vitals Reviewed: Yes    No anethesia notable event occurred.    Staff: CRNA               BP   145/93   Temp 97.6   Pulse 83   Resp 14   SpO2 99

## 2024-08-23 PROCEDURE — 88305 TISSUE EXAM BY PATHOLOGIST: CPT | Performed by: PATHOLOGY

## 2024-08-26 ENCOUNTER — TELEPHONE (OUTPATIENT)
Age: 59
End: 2024-08-26

## 2024-08-26 NOTE — TELEPHONE ENCOUNTER
Recall changed to 5 years; pt aware.   
Wife called in to go over results of colonoscopy.   All questions answered.   When should his next colonoscopy be?  Can we place a recall for him?  
detailed exam

## 2024-08-29 ENCOUNTER — OFFICE VISIT (OUTPATIENT)
Age: 59
End: 2024-08-29
Payer: COMMERCIAL

## 2024-08-29 VITALS
SYSTOLIC BLOOD PRESSURE: 132 MMHG | OXYGEN SATURATION: 98 % | WEIGHT: 229.8 LBS | BODY MASS INDEX: 35.99 KG/M2 | DIASTOLIC BLOOD PRESSURE: 72 MMHG | HEART RATE: 83 BPM | TEMPERATURE: 98.2 F

## 2024-08-29 DIAGNOSIS — E66.01 SEVERE OBESITY (BMI 35.0-39.9) WITH COMORBIDITY (HCC): Primary | ICD-10-CM

## 2024-08-29 DIAGNOSIS — E78.00 ELEVATED LDL CHOLESTEROL LEVEL: ICD-10-CM

## 2024-08-29 PROCEDURE — 99213 OFFICE O/P EST LOW 20 MIN: CPT

## 2024-08-29 NOTE — PROGRESS NOTES
Ambulatory Visit  Name: Juan M Silver      : 1965      MRN: [id number]  Encounter Provider: Walter Chong MD  Encounter Date: 2024   Encounter department: [facility]    Assessment & Plan   Patient is a 59-year-old male with past medical history of tobacco use disorder in early remission as well as elevated LDL, obesity, primary hypertension, who presents today for recheck of statin tolerance.  Patient states he has been taking the medication as prescribed with no adverse events.  Patient endorses no myalgia, discolored urine, abdominal pain, or other similar signs or symptoms.  Repeat laboratory studies ordered today as outlined below, return to clinic for next scheduled appointment in approximately 4 months for recheck labs as well as chronic health conditions or sooner if needed for problem visit.    1. Severe obesity (BMI 35.0-39.9) with comorbidity (HCC)  -     Hepatic function panel; Future  -     Lipid panel; Future  -     Hepatic function panel  -     Lipid panel  2. Elevated LDL cholesterol level  -     Lipid panel; Future  -     Lipid panel         History of Present Illness     Patient presents today for recheck of medication tolerance and recheck of laboratory studies  Patient endorses adequate medication tolerance, no adverse effects observed  Laboratory studies ordered as outlined above      Review of Systems   Constitutional:  Negative for chills and fever.   HENT:  Negative for ear pain and sore throat.    Eyes:  Negative for pain and visual disturbance.   Respiratory:  Negative for cough and shortness of breath.    Cardiovascular:  Negative for chest pain and palpitations.   Gastrointestinal:  Negative for abdominal pain and vomiting.   Genitourinary:  Negative for dysuria and hematuria.   Musculoskeletal:  Negative for arthralgias and back pain.   Skin:  Negative for color change and rash.   Neurological:  Negative for seizures and syncope.   All other systems  reviewed and are negative.    Past Medical History:   Diagnosis Date    Patient denies medical problems      Past Surgical History:   Procedure Laterality Date    NO PAST SURGERIES       History reviewed. No pertinent family history.  Social History     Tobacco Use    Smoking status: Former     Current packs/day: 1.00     Average packs/day: 1 pack/day for 40.1 years (40.1 ttl pk-yrs)     Types: Cigarettes     Start date: 7/18/1984    Smokeless tobacco: Never   Vaping Use    Vaping status: Former   Substance and Sexual Activity    Alcohol use: Not Currently    Drug use: Never    Sexual activity: Not on file     Current Outpatient Medications on File Prior to Visit   Medication Sig    atorvastatin (LIPITOR) 10 mg tablet Take 1 tablet (10 mg total) by mouth daily    varenicline (CHANTIX) 1 mg tablet Take 1 tablet (1 mg total) by mouth 2 (two) times a day    [DISCONTINUED] Varenicline Tartrate, Starter, (Chantix Starting Month Pak) 0.5 MG X 11 & 1 MG X 42 TBPK 0.5 mg once daily for 3 days then 0.5mg twice daily for days 4-7 then 1 mg twice daily     No Known Allergies    There is no immunization history on file for this patient.  Objective     /72   Pulse 83   Temp 98.2 °F (36.8 °C)   Wt 104 kg (229 lb 12.8 oz)   SpO2 98%   BMI 35.99 kg/m²     Physical Exam  Constitutional:       General: He is not in acute distress.     Appearance: He is not ill-appearing or toxic-appearing.   HENT:      Head: Normocephalic.      Right Ear: External ear normal.      Left Ear: External ear normal.      Mouth/Throat:      Mouth: Mucous membranes are moist.   Eyes:      General: No scleral icterus.     Extraocular Movements: Extraocular movements intact.      Conjunctiva/sclera: Conjunctivae normal.   Cardiovascular:      Rate and Rhythm: Normal rate and regular rhythm.      Pulses: Normal pulses.      Heart sounds: Normal heart sounds. No murmur heard.     No friction rub. No gallop.   Pulmonary:      Effort: Pulmonary effort  is normal.      Breath sounds: Normal breath sounds. No stridor. No wheezing or rales.   Abdominal:      General: Abdomen is flat.      Palpations: Abdomen is soft. There is no mass.      Tenderness: There is no abdominal tenderness. There is no guarding.   Musculoskeletal:         General: Normal range of motion.      Right lower leg: No edema.      Left lower leg: No edema.   Skin:     General: Skin is warm and dry.   Neurological:      Mental Status: He is alert. Mental status is at baseline.   Psychiatric:         Mood and Affect: Mood normal.         Behavior: Behavior normal.

## 2024-09-10 LAB
ALBUMIN SERPL-MCNC: 4.5 G/DL (ref 3.6–5.1)
ALBUMIN/GLOB SERPL: 1.8 (CALC) (ref 1–2.5)
ALP SERPL-CCNC: 87 U/L (ref 35–144)
ALT SERPL-CCNC: 32 U/L (ref 9–46)
AST SERPL-CCNC: 25 U/L (ref 10–35)
BILIRUB DIRECT SERPL-MCNC: 0.2 MG/DL
BILIRUB INDIRECT SERPL-MCNC: 0.5 MG/DL (CALC) (ref 0.2–1.2)
BILIRUB SERPL-MCNC: 0.7 MG/DL (ref 0.2–1.2)
CHOLEST SERPL-MCNC: 150 MG/DL
CHOLEST/HDLC SERPL: 3.4 (CALC)
GLOBULIN SER CALC-MCNC: 2.5 G/DL (CALC) (ref 1.9–3.7)
HDLC SERPL-MCNC: 44 MG/DL
LDLC SERPL CALC-MCNC: 77 MG/DL (CALC)
NONHDLC SERPL-MCNC: 106 MG/DL (CALC)
PROT SERPL-MCNC: 7 G/DL (ref 6.1–8.1)
TRIGL SERPL-MCNC: 197 MG/DL

## 2024-10-21 DIAGNOSIS — R73.03 PREDIABETES: ICD-10-CM

## 2024-10-21 DIAGNOSIS — I10 PRIMARY HYPERTENSION: ICD-10-CM

## 2024-10-21 DIAGNOSIS — F17.210 TOBACCO DEPENDENCE DUE TO CIGARETTES: ICD-10-CM

## 2024-10-22 RX ORDER — ATORVASTATIN CALCIUM 10 MG/1
10 TABLET, FILM COATED ORAL DAILY
Qty: 90 TABLET | Refills: 1 | Status: SHIPPED | OUTPATIENT
Start: 2024-10-22

## 2024-12-26 ENCOUNTER — OFFICE VISIT (OUTPATIENT)
Age: 59
End: 2024-12-26
Payer: COMMERCIAL

## 2024-12-26 VITALS
BODY MASS INDEX: 37.12 KG/M2 | HEART RATE: 91 BPM | TEMPERATURE: 97.8 F | SYSTOLIC BLOOD PRESSURE: 158 MMHG | DIASTOLIC BLOOD PRESSURE: 100 MMHG | OXYGEN SATURATION: 98 % | WEIGHT: 237 LBS

## 2024-12-26 DIAGNOSIS — I10 PRIMARY HYPERTENSION: Primary | ICD-10-CM

## 2024-12-26 DIAGNOSIS — F17.210 TOBACCO DEPENDENCE DUE TO CIGARETTES: ICD-10-CM

## 2024-12-26 PROCEDURE — 99213 OFFICE O/P EST LOW 20 MIN: CPT

## 2024-12-26 RX ORDER — LOSARTAN POTASSIUM 25 MG/1
25 TABLET ORAL DAILY
Qty: 30 TABLET | Refills: 0 | Status: SHIPPED | OUTPATIENT
Start: 2024-12-26 | End: 2025-01-25

## 2024-12-26 RX ORDER — VARENICLINE TARTRATE 1 MG/1
1 TABLET, FILM COATED ORAL 2 TIMES DAILY
Qty: 60 TABLET | Refills: 0 | Status: SHIPPED | OUTPATIENT
Start: 2024-12-26 | End: 2025-01-25

## 2024-12-26 NOTE — ASSESSMENT & PLAN NOTE
BP Readings from Last 1 Encounters:   12/26/24 158/100   Patient presents for evaluation following failed DOT physical due to hypertension.  Hypertensive at time of presentation in office, as per patient since he stopped smoking he has gained several pounds which may be driving his hypertension    Will attempt trial of Cozaar at office visit today, return to office in 1 month for assessment of renal tolerance and possible up titration    Orders:    losartan (COZAAR) 25 mg tablet; Take 1 tablet (25 mg total) by mouth daily    Basic metabolic panel; Future

## 2024-12-26 NOTE — PROGRESS NOTES
Patient name: Juan M Silver      : 1965      MRN: [id number]  Encounter Provider: Walter Chong MD  Encounter Date: 2024   Encounter department: [facility]    Assessment & Plan  Primary hypertension  BP Readings from Last 1 Encounters:   24 158/100   Patient presents for evaluation following failed DOT physical due to hypertension.  Hypertensive at time of presentation in office, as per patient since he stopped smoking he has gained several pounds which may be driving his hypertension    Will attempt trial of Cozaar at office visit today, return to office in 1 month for assessment of renal tolerance and possible up titration    Orders:    losartan (COZAAR) 25 mg tablet; Take 1 tablet (25 mg total) by mouth daily    Basic metabolic panel; Future    Tobacco dependence due to cigarettes    Orders:    varenicline (CHANTIX) 1 mg tablet; Take 1 tablet (1 mg total) by mouth 2 (two) times a day         History of Present Illness     Patient presents for BP check following failed DOT physical      Review of Systems   Constitutional:  Negative for chills and fever.   HENT:  Negative for ear pain and sore throat.    Eyes:  Negative for pain and visual disturbance.   Respiratory:  Negative for cough and shortness of breath.    Cardiovascular:  Negative for chest pain and palpitations.   Gastrointestinal:  Negative for abdominal pain and vomiting.   Genitourinary:  Negative for dysuria and hematuria.   Musculoskeletal:  Negative for arthralgias and back pain.   Skin:  Negative for color change and rash.   Neurological:  Negative for seizures and syncope.   All other systems reviewed and are negative.    Past Medical History:   Diagnosis Date    Patient denies medical problems      Past Surgical History:   Procedure Laterality Date    NO PAST SURGERIES       History reviewed. No pertinent family history.  Social History     Tobacco Use    Smoking status: Former     Current packs/day: 1.00      Average packs/day: 1 pack/day for 40.4 years (40.4 ttl pk-yrs)     Types: Cigarettes     Start date: 7/18/1984    Smokeless tobacco: Never   Vaping Use    Vaping status: Former   Substance and Sexual Activity    Alcohol use: Not Currently    Drug use: Never    Sexual activity: Not on file     Current Outpatient Medications on File Prior to Visit   Medication Sig    atorvastatin (LIPITOR) 10 mg tablet TAKE 1 TABLET(10 MG) BY MOUTH DAILY    varenicline (CHANTIX) 1 mg tablet Take 1 tablet (1 mg total) by mouth 2 (two) times a day     No Known Allergies    There is no immunization history on file for this patient.  Objective   /100   Pulse 91   Temp 97.8 °F (36.6 °C)   Wt 108 kg (237 lb)   SpO2 98%   BMI 37.12 kg/m²     Physical Exam  Constitutional:       General: He is not in acute distress.     Appearance: He is not ill-appearing or toxic-appearing.   HENT:      Head: Normocephalic.      Right Ear: External ear normal.      Left Ear: External ear normal.      Mouth/Throat:      Mouth: Mucous membranes are moist.   Eyes:      General: No scleral icterus.     Extraocular Movements: Extraocular movements intact.      Conjunctiva/sclera: Conjunctivae normal.   Cardiovascular:      Rate and Rhythm: Normal rate and regular rhythm.      Pulses: Normal pulses.      Heart sounds: Normal heart sounds. No murmur heard.     No friction rub. No gallop.   Pulmonary:      Effort: Pulmonary effort is normal.      Breath sounds: Normal breath sounds. No stridor. No wheezing or rales.   Abdominal:      General: Abdomen is flat.      Palpations: Abdomen is soft. There is no mass.      Tenderness: There is no abdominal tenderness. There is no guarding.   Musculoskeletal:         General: Normal range of motion.      Right lower leg: No edema.      Left lower leg: No edema.   Skin:     General: Skin is warm and dry.   Neurological:      Mental Status: He is alert. Mental status is at baseline.   Psychiatric:         Mood  and Affect: Mood normal.         Behavior: Behavior normal.

## 2024-12-26 NOTE — ASSESSMENT & PLAN NOTE
Orders:    varenicline (CHANTIX) 1 mg tablet; Take 1 tablet (1 mg total) by mouth 2 (two) times a day

## 2025-01-16 LAB
BUN SERPL-MCNC: 21 MG/DL (ref 7–25)
BUN/CREAT SERPL: ABNORMAL (CALC) (ref 6–22)
CALCIUM SERPL-MCNC: 9.8 MG/DL (ref 8.6–10.3)
CHLORIDE SERPL-SCNC: 109 MMOL/L (ref 98–110)
CO2 SERPL-SCNC: 27 MMOL/L (ref 20–32)
CREAT SERPL-MCNC: 1.01 MG/DL (ref 0.7–1.3)
GFR/BSA.PRED SERPLBLD CYS-BASED-ARV: 86 ML/MIN/1.73M2
GLUCOSE SERPL-MCNC: 117 MG/DL (ref 65–99)
POTASSIUM SERPL-SCNC: 4.2 MMOL/L (ref 3.5–5.3)
SODIUM SERPL-SCNC: 144 MMOL/L (ref 135–146)

## 2025-01-22 DIAGNOSIS — I10 PRIMARY HYPERTENSION: ICD-10-CM

## 2025-01-22 RX ORDER — LOSARTAN POTASSIUM 25 MG/1
25 TABLET ORAL DAILY
Qty: 30 TABLET | Refills: 0 | Status: SHIPPED | OUTPATIENT
Start: 2025-01-22 | End: 2025-01-23

## 2025-01-23 ENCOUNTER — RESULTS FOLLOW-UP (OUTPATIENT)
Age: 60
End: 2025-01-23

## 2025-01-23 ENCOUNTER — OFFICE VISIT (OUTPATIENT)
Age: 60
End: 2025-01-23
Payer: COMMERCIAL

## 2025-01-23 VITALS
SYSTOLIC BLOOD PRESSURE: 160 MMHG | OXYGEN SATURATION: 97 % | BODY MASS INDEX: 37.84 KG/M2 | WEIGHT: 241.6 LBS | DIASTOLIC BLOOD PRESSURE: 98 MMHG | HEART RATE: 86 BPM | TEMPERATURE: 98.5 F

## 2025-01-23 DIAGNOSIS — E66.01 SEVERE OBESITY (BMI 35.0-39.9) WITH COMORBIDITY (HCC): ICD-10-CM

## 2025-01-23 DIAGNOSIS — I10 PRIMARY HYPERTENSION: Primary | ICD-10-CM

## 2025-01-23 PROCEDURE — 99213 OFFICE O/P EST LOW 20 MIN: CPT

## 2025-01-23 RX ORDER — LOSARTAN POTASSIUM 50 MG/1
50 TABLET ORAL DAILY
Qty: 30 TABLET | Refills: 0 | Status: SHIPPED | OUTPATIENT
Start: 2025-01-23 | End: 2025-01-23

## 2025-01-23 RX ORDER — LOSARTAN POTASSIUM 100 MG/1
100 TABLET ORAL DAILY
Qty: 30 TABLET | Refills: 0 | Status: SHIPPED | OUTPATIENT
Start: 2025-01-23

## 2025-01-23 NOTE — PROGRESS NOTES
Name: Juan M Silver      : 1965      MRN: [id number]  Encounter Provider: Walter Chong MD  Encounter Date: 2025   Encounter department: [facility]    Assessment & Plan  Primary hypertension  BP Readings from Last 1 Encounters:   25 160/98     Patient presents for follow-up of hypertension.  At last visit patient was initiated on losartan 25 mg with recheck BMP and BP in approximately 1 month.  Patient states since that point in time he has been taking the medication daily.    Blood pressure still significantly elevated at office visit today, review of BMP demonstrates renal tolerance of medication.  Patient states he feels overall well, endorses no symptoms of hypertension at this point in time.    Plan:  Since quitting smoking patient has demonstrated significant weight gain which is likely driving some element of hypertension  Referral to medical fitness given today, patient advised to follow-up with this and initiate some sort of exercise protocol  Will increase losartan to 100 mg daily, recheck BP in 2 weeks and BMP.  If patient is still significantly hypertensive at that time would consider initiating losartan/HCTZ or other similar combination medication  Can also consider sleep study as patient has gained significant weight, some concern for sleep apnea present  Orders:    Ambulatory Referral to Medical Fitness Exercise Specialist; Future    losartan (COZAAR) 100 MG tablet; Take 1 tablet (100 mg total) by mouth daily    Severe obesity (BMI 35.0-39.9) with comorbidity (HCC)      Orders:    Ambulatory Referral to Medical Fitness Exercise Specialist; Future         History of Present Illness     Patient presents for BP check following failed DOT physical      Review of Systems   Constitutional:  Negative for chills and fever.   HENT:  Negative for ear pain and sore throat.    Eyes:  Negative for pain and visual disturbance.   Respiratory:  Negative for cough and shortness  of breath.    Cardiovascular:  Negative for chest pain and palpitations.   Gastrointestinal:  Negative for abdominal pain and vomiting.   Genitourinary:  Negative for dysuria and hematuria.   Musculoskeletal:  Negative for arthralgias and back pain.   Skin:  Negative for color change and rash.   Neurological:  Negative for seizures and syncope.   All other systems reviewed and are negative.    Past Medical History:   Diagnosis Date    Patient denies medical problems      Past Surgical History:   Procedure Laterality Date    NO PAST SURGERIES       History reviewed. No pertinent family history.  Social History     Tobacco Use    Smoking status: Former     Current packs/day: 1.00     Average packs/day: 1 pack/day for 40.5 years (40.5 ttl pk-yrs)     Types: Cigarettes     Start date: 7/18/1984    Smokeless tobacco: Never   Vaping Use    Vaping status: Former   Substance and Sexual Activity    Alcohol use: Not Currently    Drug use: Never    Sexual activity: Not on file     Current Outpatient Medications on File Prior to Visit   Medication Sig    atorvastatin (LIPITOR) 10 mg tablet TAKE 1 TABLET(10 MG) BY MOUTH DAILY    [DISCONTINUED] losartan (COZAAR) 25 mg tablet TAKE 1 TABLET(25 MG) BY MOUTH DAILY    [DISCONTINUED] varenicline (CHANTIX) 1 mg tablet Take 1 tablet (1 mg total) by mouth 2 (two) times a day     No Known Allergies    There is no immunization history on file for this patient.  Objective   /98   Pulse 86   Temp 98.5 °F (36.9 °C)   Wt 110 kg (241 lb 9.6 oz)   SpO2 97%   BMI 37.84 kg/m²     Physical Exam  Constitutional:       General: He is not in acute distress.     Appearance: He is obese. He is not ill-appearing or toxic-appearing.   HENT:      Head: Normocephalic.      Right Ear: External ear normal.      Left Ear: External ear normal.      Mouth/Throat:      Mouth: Mucous membranes are moist.   Eyes:      General: No scleral icterus.     Extraocular Movements: Extraocular movements intact.       Conjunctiva/sclera: Conjunctivae normal.   Cardiovascular:      Rate and Rhythm: Normal rate and regular rhythm.      Pulses: Normal pulses.      Heart sounds: Normal heart sounds. No murmur heard.     No friction rub. No gallop.   Pulmonary:      Effort: Pulmonary effort is normal.      Breath sounds: Normal breath sounds. No stridor. No wheezing or rales.   Abdominal:      General: Abdomen is flat.      Palpations: Abdomen is soft. There is no mass.      Tenderness: There is no abdominal tenderness. There is no guarding.   Musculoskeletal:         General: Normal range of motion.      Right lower leg: No edema.      Left lower leg: No edema.   Skin:     General: Skin is warm and dry.   Neurological:      Mental Status: He is alert. Mental status is at baseline.   Psychiatric:         Mood and Affect: Mood normal.         Behavior: Behavior normal.

## 2025-01-23 NOTE — ASSESSMENT & PLAN NOTE
BP Readings from Last 1 Encounters:   01/23/25 160/98     Patient presents for follow-up of hypertension.  At last visit patient was initiated on losartan 25 mg with recheck BMP and BP in approximately 1 month.  Patient states since that point in time he has been taking the medication daily.    Blood pressure still significantly elevated at office visit today, review of BMP demonstrates renal tolerance of medication.  Patient states he feels overall well, endorses no symptoms of hypertension at this point in time.    Plan:  Since quitting smoking patient has demonstrated significant weight gain which is likely driving some element of hypertension  Referral to medical fitness given today, patient advised to follow-up with this and initiate some sort of exercise protocol  Will increase losartan to 100 mg daily, recheck BP in 2 weeks and BMP.  If patient is still significantly hypertensive at that time would consider initiating losartan/HCTZ or other similar combination medication  Can also consider sleep study as patient has gained significant weight, some concern for sleep apnea present  Orders:    Ambulatory Referral to Medical Fitness Exercise Specialist; Future    losartan (COZAAR) 100 MG tablet; Take 1 tablet (100 mg total) by mouth daily

## 2025-02-11 ENCOUNTER — OFFICE VISIT (OUTPATIENT)
Age: 60
End: 2025-02-11
Payer: COMMERCIAL

## 2025-02-11 VITALS
DIASTOLIC BLOOD PRESSURE: 98 MMHG | HEART RATE: 86 BPM | TEMPERATURE: 98.2 F | WEIGHT: 237.6 LBS | BODY MASS INDEX: 37.29 KG/M2 | OXYGEN SATURATION: 95 % | SYSTOLIC BLOOD PRESSURE: 138 MMHG | HEIGHT: 67 IN

## 2025-02-11 DIAGNOSIS — I10 PRIMARY HYPERTENSION: Primary | ICD-10-CM

## 2025-02-11 PROCEDURE — 99213 OFFICE O/P EST LOW 20 MIN: CPT

## 2025-02-11 RX ORDER — LOSARTAN POTASSIUM AND HYDROCHLOROTHIAZIDE 12.5; 5 MG/1; MG/1
1 TABLET ORAL DAILY
Qty: 30 TABLET | Refills: 2 | Status: SHIPPED | OUTPATIENT
Start: 2025-02-11

## 2025-02-11 NOTE — ASSESSMENT & PLAN NOTE
BP Readings from Last 1 Encounters:   02/11/25 138/98     Patient has been taking losartan 50mg daily, but blood pressure remains elevated and currently above goal of <140/90. Kidney function remains stable. Will add thiazide diuretic to better manage hypertension. Follow-up in one month to reassess BP and new med tolerance. BMP and Mg labs ordered to monitor kidney function and electrolyte abnormalities.    Orders:    losartan-hydrochlorothiazide (HYZAAR) 50-12.5 mg per tablet; Take 1 tablet by mouth daily    Basic metabolic panel; Future    Magnesium; Future

## 2025-02-11 NOTE — PROGRESS NOTES
Name: Juan M Silver      : 1965      MRN: [id number]  Encounter Provider: Walter Chong MD  Encounter Date: 2025   Encounter department: [facility]  :  Assessment & Plan  Primary hypertension  BP Readings from Last 1 Encounters:   25 138/98     Patient has been taking losartan 50mg daily, but blood pressure remains elevated and currently above goal of <140/90. Kidney function remains stable. Will add thiazide diuretic to better manage hypertension. Follow-up in one month to reassess BP and new med tolerance. BMP and Mg labs ordered to monitor kidney function and electrolyte abnormalities.    Orders:    losartan-hydrochlorothiazide (HYZAAR) 50-12.5 mg per tablet; Take 1 tablet by mouth daily    Basic metabolic panel; Future    Magnesium; Future         History of Present Illness   Patient presents to office for follow-up of hypertension. Patient is currently taking 50mg losartan daily. Home blood pressures range around 140 systolic. In the office, blood pressure is better controlled than previous visits, but remains elevated. Patient reports feeling well overall and endorses no symptoms of hypertension at this time. Patient has also cut back on nighttimes snacks and Gatorade and is drinking more water. He is not exercising regularly. Denies any problems with sleep, morning headaches, and daytime fatigue.      Review of Systems   Constitutional:  Negative for chills, fatigue and fever.   HENT:  Negative for congestion, ear pain and sore throat.    Eyes:  Negative for pain, redness and visual disturbance.   Respiratory:  Negative for cough and shortness of breath.    Cardiovascular:  Negative for chest pain and palpitations.   Gastrointestinal:  Negative for abdominal pain, nausea and vomiting.   Genitourinary:  Negative for dysuria and hematuria.   Musculoskeletal:  Negative for arthralgias and back pain.   Neurological:  Negative for dizziness, syncope, light-headedness and  "headaches.   All other systems reviewed and are negative.      Objective   /98   Pulse 86   Temp 98.2 °F (36.8 °C)   Ht 5' 7\" (1.702 m)   Wt 108 kg (237 lb 9.6 oz)   SpO2 95%   BMI 37.21 kg/m²      Physical Exam  Constitutional:       Appearance: Normal appearance.   HENT:      Head: Normocephalic and atraumatic.   Eyes:      Conjunctiva/sclera: Conjunctivae normal.   Cardiovascular:      Rate and Rhythm: Normal rate and regular rhythm.      Heart sounds: Normal heart sounds.   Pulmonary:      Effort: Pulmonary effort is normal.      Breath sounds: Normal breath sounds.   Abdominal:      General: Abdomen is flat.      Palpations: Abdomen is soft.   Skin:     General: Skin is warm and dry.   Neurological:      Mental Status: He is alert and oriented to person, place, and time.         "

## 2025-02-28 DIAGNOSIS — I10 PRIMARY HYPERTENSION: ICD-10-CM

## 2025-02-28 RX ORDER — LOSARTAN POTASSIUM 50 MG/1
50 TABLET ORAL DAILY
Qty: 30 TABLET | Refills: 0 | OUTPATIENT
Start: 2025-02-28

## 2025-03-05 ENCOUNTER — RA CDI HCC (OUTPATIENT)
Dept: OTHER | Facility: HOSPITAL | Age: 60
End: 2025-03-05

## 2025-03-11 ENCOUNTER — OFFICE VISIT (OUTPATIENT)
Age: 60
End: 2025-03-11
Payer: COMMERCIAL

## 2025-03-11 VITALS
SYSTOLIC BLOOD PRESSURE: 128 MMHG | OXYGEN SATURATION: 96 % | HEART RATE: 62 BPM | BODY MASS INDEX: 36.57 KG/M2 | HEIGHT: 67 IN | WEIGHT: 233 LBS | DIASTOLIC BLOOD PRESSURE: 72 MMHG | TEMPERATURE: 98.2 F

## 2025-03-11 DIAGNOSIS — E78.00 ELEVATED LDL CHOLESTEROL LEVEL: ICD-10-CM

## 2025-03-11 DIAGNOSIS — I10 PRIMARY HYPERTENSION: Primary | ICD-10-CM

## 2025-03-11 PROCEDURE — 99213 OFFICE O/P EST LOW 20 MIN: CPT

## 2025-03-11 RX ORDER — LOSARTAN POTASSIUM AND HYDROCHLOROTHIAZIDE 12.5; 5 MG/1; MG/1
1 TABLET ORAL DAILY
Qty: 90 TABLET | Refills: 1 | Status: SHIPPED | OUTPATIENT
Start: 2025-03-11

## 2025-03-11 NOTE — PROGRESS NOTES
Name: Juan M Silver      : 1965      MRN: [id number]  Encounter Provider: Walter Chong MD  Encounter Date: 3/11/2025   Encounter department: [facility]    Assessment & Plan  Primary hypertension  BP Readings from Last 1 Encounters:   25 128/72     Blood pressure currently at goal of less than 140 over less than 90.  Patient has been compliant with his medication and is well tolerating them currently, states he is not experiencing any ill effect.  Patient additionally endorses significant weight loss from his last visit, and states he feels overall better than he has in a long time.  From a medical standpoint, patient is medically optimized to return to his previous assignments as his blood pressure is well-controlled, his dyslipidemia is well-controlled, he has maintained abstinence from tobacco, and he has overall decreasing weight.     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  At time of initial visit, patient was hypertensive, dyslipidemic, had impaired fasting blood sugar, and had cigarette nicotine dependence.  Combined with his age and weight his ASCVD score at initial presentation was 19.5 with recommendation at that point in time for moderate to high intensity statin regimen.  Since initiation of statin patient has additionally gotten his blood pressure under control and has stopped smoking.  Patient's ASCVD risk score is now 8% which is a marked improvement from prior but still places him at intermediate risk with recommendation at  this point for low to moderate intensity statin.  Patient will be maintained on moderate intensity statin, can discuss discontinuation if patient remains abstinent from nicotine, continues to lose weight, and requires down titration of antihypertensive regimen.  Otherwise, patient would likely benefit from statin therapy in the long-term to reduce his ASCVD risk.              History of Present Illness     Patient is a 60-year-old male with past medical history of elevated LDL, hypertension, prediabetes, cigarette nicotine addiction in remission, who presents today for recheck of BP and med tolerance.  Patient states he is tolerating losartan hydrochlorothiazide well, does endorse some frequent urination but states it is acceptable to him.  Patient states he has not yet completed laboratory studies ordered at last visit, was unaware that he had to get these.      Review of Systems   Constitutional:  Negative for chills, fatigue and fever.   HENT:  Negative for congestion, ear pain and sore throat.    Eyes:  Negative for pain, redness and visual disturbance.   Respiratory:  Negative for cough and shortness of breath.    Cardiovascular:  Negative for chest pain and palpitations.   Gastrointestinal:  Negative for abdominal pain, nausea and vomiting.   Genitourinary:  Negative for dysuria and hematuria.   Musculoskeletal:  Negative for arthralgias and back pain.   Neurological:  Negative for dizziness, syncope, light-headedness and headaches.   All other systems reviewed and are negative.    Past Medical History:   Diagnosis Date    Patient denies medical problems      Past Surgical History:   Procedure Laterality Date    NO PAST SURGERIES       No family history on file.  Social History     Tobacco Use    Smoking status: Former     Current packs/day: 1.00     Average packs/day: 1 pack/day for 40.6 years (40.6 ttl pk-yrs)     Types: Cigarettes     Start date: 7/18/1984    Smokeless tobacco: Never   Vaping Use    Vaping  status: Former   Substance and Sexual Activity    Alcohol use: Not Currently    Drug use: Never    Sexual activity: Not on file     Current Outpatient Medications on File Prior to Visit   Medication Sig    atorvastatin (LIPITOR) 10 mg tablet TAKE 1 TABLET(10 MG) BY MOUTH DAILY    losartan-hydrochlorothiazide (HYZAAR) 50-12.5 mg per tablet Take 1 tablet by mouth daily     No Known Allergies    There is no immunization history on file for this patient.  Objective   There were no vitals taken for this visit.    Physical Exam  Constitutional:       Appearance: Normal appearance.   HENT:      Head: Normocephalic and atraumatic.      Mouth/Throat:      Mouth: Mucous membranes are moist.      Pharynx: Oropharynx is clear.   Eyes:      Conjunctiva/sclera: Conjunctivae normal.   Cardiovascular:      Rate and Rhythm: Normal rate and regular rhythm.      Pulses: Normal pulses.      Heart sounds: Normal heart sounds. No murmur heard.     No friction rub. No gallop.   Pulmonary:      Effort: Pulmonary effort is normal.      Breath sounds: Normal breath sounds. No wheezing, rhonchi or rales.   Abdominal:      General: Abdomen is flat.      Palpations: Abdomen is soft. There is no mass.      Tenderness: There is no abdominal tenderness. There is no guarding.   Musculoskeletal:      Right lower leg: No edema.      Left lower leg: No edema.   Skin:     General: Skin is warm and dry.   Neurological:      Mental Status: He is alert and oriented to person, place, and time.   Psychiatric:         Mood and Affect: Mood normal.

## 2025-03-11 NOTE — ASSESSMENT & PLAN NOTE
Since initiation of statin patient has demonstrated improvement in LDL cholesterol from 122 to 77.  At time of initial visit, patient was hypertensive, dyslipidemic, had impaired fasting blood sugar, and had cigarette nicotine dependence.  Combined with his age and weight his ASCVD score at initial presentation was 19.5 with recommendation at that point in time for moderate to high intensity statin regimen.  Since initiation of statin patient has additionally gotten his blood pressure under control and has stopped smoking.  Patient's ASCVD risk score is now 8% which is a marked improvement from prior but still places him at intermediate risk with recommendation at this point for low to moderate intensity statin.  Patient will be maintained on moderate intensity statin, can discuss discontinuation if patient remains abstinent from nicotine, continues to lose weight, and requires down titration of antihypertensive regimen.  Otherwise, patient would likely benefit from statin therapy in the long-term to reduce his ASCVD risk.

## 2025-03-11 NOTE — ASSESSMENT & PLAN NOTE
BP Readings from Last 1 Encounters:   03/11/25 128/72     Blood pressure currently at goal of less than 140 over less than 90.  Patient has been compliant with his medication and is well tolerating them currently, states he is not experiencing any ill effect.  Patient additionally endorses significant weight loss from his last visit, and states he feels overall better than he has in a long time.  From a medical standpoint, patient is medically optimized to return to his previous assignments as his blood pressure is well-controlled, his dyslipidemia is well-controlled, he has maintained abstinence from tobacco, and he has overall decreasing weight.     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

## 2025-03-21 DIAGNOSIS — R73.03 PREDIABETES: ICD-10-CM

## 2025-03-21 DIAGNOSIS — F17.210 TOBACCO DEPENDENCE DUE TO CIGARETTES: ICD-10-CM

## 2025-03-21 DIAGNOSIS — I10 PRIMARY HYPERTENSION: ICD-10-CM

## 2025-03-21 RX ORDER — ATORVASTATIN CALCIUM 10 MG/1
10 TABLET, FILM COATED ORAL DAILY
Qty: 90 TABLET | Refills: 1 | Status: SHIPPED | OUTPATIENT
Start: 2025-03-21

## 2025-04-08 ENCOUNTER — NURSE TRIAGE (OUTPATIENT)
Age: 60
End: 2025-04-08

## 2025-04-08 DIAGNOSIS — F17.210 TOBACCO DEPENDENCE DUE TO CIGARETTES: ICD-10-CM

## 2025-04-08 DIAGNOSIS — R73.03 PREDIABETES: ICD-10-CM

## 2025-04-08 DIAGNOSIS — I10 PRIMARY HYPERTENSION: ICD-10-CM

## 2025-04-08 RX ORDER — ATORVASTATIN CALCIUM 10 MG/1
10 TABLET, FILM COATED ORAL DAILY
Qty: 90 TABLET | Refills: 1 | Status: SHIPPED | OUTPATIENT
Start: 2025-04-08

## 2025-04-08 NOTE — TELEPHONE ENCOUNTER
Regarding: switch pharmacy  ----- Message from Gege KING sent at 4/8/2025  4:33 PM EDT -----  Pt's wife Emy calling in regarding pt's atorvastatin; it was sent to [facility] on 3/21 and they just heard from insurance that it is only covered at [facility] and would need the script changed over.    Please advise and send script to [facility]/pharmacy #[id number] - [place] - [address].

## 2025-04-08 NOTE — TELEPHONE ENCOUNTER
"Reason for Disposition  • Prescription prescribed recently is not at pharmacy and triager has access to patient's EMR and prescription is recorded in the EMR    Answer Assessment - Initial Assessment Questions  1. NAME of MEDICINE: \"What medicine(s) are you calling about?\"      Atorvastatin  2. QUESTION: \"What is your question?\" (e.g., double dose of medicine, side effect)     Pharmacy change  3. PRESCRIBER: \"Who prescribed the medicine?\" Reason: if prescribed by specialist, call should be referred to that group.      [place]    Protocols used: Medication Question Call-Adult-[place]    "

## 2025-04-09 DIAGNOSIS — I10 PRIMARY HYPERTENSION: ICD-10-CM

## 2025-04-09 NOTE — TELEPHONE ENCOUNTER
Medication: Losartan     Dose/Frequency: 50-12.5mg    Quantity: 90    Pharmacy: [facility]     Office:   [x] PCP/Provider -   [] Speciality/Provider -     Does the patient have enough for 3 days?   [x] Yes   [] No - Send as HP to POD

## 2025-04-10 RX ORDER — LOSARTAN POTASSIUM AND HYDROCHLOROTHIAZIDE 12.5; 5 MG/1; MG/1
1 TABLET ORAL DAILY
Qty: 90 TABLET | Refills: 1 | Status: SHIPPED | OUTPATIENT
Start: 2025-04-10